# Patient Record
Sex: FEMALE | Race: WHITE | URBAN - METROPOLITAN AREA
[De-identification: names, ages, dates, MRNs, and addresses within clinical notes are randomized per-mention and may not be internally consistent; named-entity substitution may affect disease eponyms.]

---

## 2017-01-26 ENCOUNTER — OFFICE VISIT (OUTPATIENT)
Dept: FAMILY MEDICINE | Facility: CLINIC | Age: 57
End: 2017-01-26

## 2017-01-26 VITALS
SYSTOLIC BLOOD PRESSURE: 126 MMHG | TEMPERATURE: 97.9 F | DIASTOLIC BLOOD PRESSURE: 81 MMHG | WEIGHT: 107.08 LBS | HEART RATE: 80 BPM | BODY MASS INDEX: 18.37 KG/M2

## 2017-01-26 DIAGNOSIS — B02.22 TRIGEMINAL HERPES ZOSTER: Primary | ICD-10-CM

## 2017-01-26 RX ORDER — VALACYCLOVIR HYDROCHLORIDE 1 G/1
1000 TABLET, FILM COATED ORAL 3 TIMES DAILY
Qty: 21 TABLET | Refills: 0 | Status: SHIPPED | OUTPATIENT
Start: 2017-01-26 | End: 2017-10-05

## 2017-01-26 RX ORDER — HYDROCODONE BITARTRATE AND ACETAMINOPHEN 5; 325 MG/1; MG/1
TABLET ORAL
Qty: 10 TABLET | Refills: 0 | Status: SHIPPED | OUTPATIENT
Start: 2017-01-26 | End: 2017-10-05

## 2017-01-26 ASSESSMENT — PAIN SCALES - GENERAL: PAINLEVEL: NO PAIN (0)

## 2017-01-26 NOTE — MR AVS SNAPSHOT
After Visit Summary   1/26/2017    Evelia Teixeira    MRN: [id number]           Patient Information     Date Of Birth          1960        Visit Information        Provider Department      1/26/2017 10:40 AM Timothy Sagastume MD [facility]         Follow-ups after your visit        Who to contact     Please call your clinic at [phone] to:    Ask questions about your health    Make or cancel appointments    Discuss your medicines    Learn about your test results    Speak to your doctor   If you have compliments or concerns about an experience at your clinic, or if you wish to file a complaint, please contact [facility] Patient Relations at [phone] or email us at [email or web address]         Additional Information About Your Visit        MyChart Information     [employer] gives you secure access to your electronic health record. If you see a primary care provider, you can also send messages to your care team and make appointments. If you have questions, please call your primary care clinic.  If you do not have a primary care provider, please call [phone] and they will assist you.      [employer] is an electronic gateway that provides easy, online access to your medical records. With [employer], you can request a clinic appointment, read your test results, renew a prescription or communicate with your care team.     To access your existing account, please contact your [facility] or call [phone] for assistance.        Care EveryWhere ID     This is your Care EveryWhere ID. This could be used by other organizations to access your [place] medical records  [id number]        Your Vitals Were     Pulse Temperature                80 97.9  F (36.6  C) (Oral)           Blood Pressure from Last 3 Encounters:   01/26/17 126/81   08/18/16 111/73   05/27/16 112/70    Weight from Last 3 Encounters:   01/26/17 107 lb 1.3 oz  (48.571 kg)   08/18/16 109 lb 0.6 oz (49.46 kg)   05/27/16 109 lb 8 oz (49.669 kg)              Today, you had the following     No orders found for display       Primary Care Provider Office Phone # Fax #    Timothy Sagastume [phone]       [address] [address]        Thank you!     Thank you for choosing [facility]  for your care. Our goal is always to provide you with excellent care. Hearing back from our patients is one way we can continue to improve our services. Please take a few minutes to complete the written survey that you may receive in the mail after your visit with us. Thank you!             Your Updated Medication List - Protect others around you: Learn how to safely use, store and throw away your medicines at www.disposemymeds.org.          This list is accurate as of: 1/26/17 10:59 AM.  Always use your most recent med list.                   Brand Name Dispense Instructions for use    CALCIUM PO          EXCEDRIN PO      Take 1 tablet by mouth as needed.       MAGNESIUM PO      With Vitamin B.       VITAMIN D-3 PO      Take 500 Units by mouth

## 2017-01-26 NOTE — NURSING NOTE
56 year old  Chief Complaint   Patient presents with     Derm Problem     Sores on nose and mouth. Started friday or saturday.       Blood pressure 126/81, pulse 80, temperature 97.9  F (36.6  C), temperature source Oral, weight 107 lb 1.3 oz (48.571 kg). Body mass index is 18.37 kg/(m^2).  Patient Active Problem List   Diagnosis     Preventative health care     Migraine headache     Bilateral bunions     Family history of early CAD       Wt Readings from Last 2 Encounters:   01/26/17 107 lb 1.3 oz (48.571 kg)   08/18/16 109 lb 0.6 oz (49.46 kg)     BP Readings from Last 3 Encounters:   01/26/17 126/81   08/18/16 111/73   05/27/16 112/70         Current Outpatient Prescriptions   Medication     Cholecalciferol (VITAMIN D-3 PO)     MAGNESIUM PO     CALCIUM PO     Aspirin-Acetaminophen-Caffeine (EXCEDRIN PO)     No current facility-administered medications for this visit.       Social History   Substance Use Topics     Smoking status: Never Smoker      Smokeless tobacco: Not on file     Alcohol Use: No       Health Maintenance Due   Topic Date Due     ADVANCE DIRECTIVE PLANNING Q5 YRS (NO INBASKET)  12/15/1976     HEPATITIS C SCREENING  12/15/1976     COLON CANCER SCREEN (SYSTEM ASSIGNED)  10/15/2013     DEXA Q3 YR  09/23/2014     INFLUENZA VACCINE (SYSTEM ASSIGNED)  09/01/2016       PAP      NIL   9/20/2010 January 26, 2017 10:41 AM    Harriett Degroot CMA

## 2017-01-27 NOTE — PROGRESS NOTES
CHIEF COMPLAINT:  Sores in nose and mouth.      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  Symptoms began 6-7 days ago.  At first, she noticed this very subtle increase in sensitivity.  However, soon after that she started noticing some red bumps and even blisters.  All of this occurred after she had worked almost 60 straight hours.  She is not having any problems at work, but she has just had a lot of stress and lack of sleep.  She had chickenpox as a child.  Her  thinks that she might have shingles.      ACTIVE MEDICAL PROBLEMS:  Briefly reviewed.       CURRENT MEDICATIONS:  She is on no prescription medications.        SOCIAL HISTORY:  She is not a smoker.      OBJECTIVE:  Evelia is in no distress.  Vital signs are stable.  She has a cluster of red bumps on the right nostril on and also on the right lip, nothing near the eye.  This would seem to be a fairly classic case of trigeminal shingles occurring in the V2 distribution.  We had a long conversation about this.      ASSESSMENT AND PLAN:   1.  Trigeminal herpes zoster on the right.   2.  Unfortunately, we are out of the perfect time frame to treat this.  However, it is possible that she has had another lesion or 2 emerge over the last day or so.  Therefore, we are going to go ahead with Valacyclovir 1000 mg pills, 1 tablet 3 times daily for 7 days, 21 tablets dispensed with no refills.   3.  She had a very hard time sleeping last night due to the pain.  I will go ahead and prescribe hydrocodone 5/325 mg tablets, quantity 10, 1-2 every 4-6 hours as needed.   4.  She can use some over-the-counter Lidoderm/lidocaine cream or gel.   5.  She will let me know how she is doing in the coming days.   6.  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  She will notify Employee Health at her  [facility] and let them know that she has been diagnosed with shingles.  She was wearing a mask when she was in contact with young children yesterday.  Call with any problems or questions.

## 2017-02-08 ENCOUNTER — TELEPHONE (OUTPATIENT)
Dept: NURSING | Facility: CLINIC | Age: 57
End: 2017-02-08

## 2017-02-08 DIAGNOSIS — J10.1 INFLUENZA A: Primary | ICD-10-CM

## 2017-02-08 RX ORDER — OSELTAMIVIR PHOSPHATE 75 MG/1
75 CAPSULE ORAL 2 TIMES DAILY
Qty: 10 CAPSULE | Refills: 0 | Status: SHIPPED | OUTPATIENT
Start: 2017-02-08 | End: 2017-10-05

## 2017-02-08 NOTE — TELEPHONE ENCOUNTER
Discussed pt symptoms with Dr Tanika CUTLER for tamiflu course. Script to pharmacy.  Pt reports temp 101.6 this morning. She will call back prn concerns.

## 2017-02-08 NOTE — TELEPHONE ENCOUNTER
"Call Type: Triage Call    Presenting Problem: flu/ cough and \"blowing nose\" no fever felt  chilled before bed time/and a headache kept her awake during the  day/others have had the flu at work/ and she just got over shingles/  advised to call back and take the afternnoon appointment at the  clinic/ wants FNA to leaft a message for Dr Jimenez to call/ wants  to kow if he will order tamiflu/ advised that it would be best to  know if she has the flu first/ but she requested a call from the  clinic first  Triage Note:  Guideline Title: Flu-Like Symptoms  Recommended Disposition: Call Provider within 24 Hours  Original Inclination: Did not know what to do  Override Disposition:  Intended Action: Follow advice given  Physician Contacted: No  In high risk group for complications of influenza AND has questions/concerns ?  YES  Signs of dehydration ? NO  Severe breathing problems ? NO  Breathing problems ? NO  Sudden change in mental status ? NO  Choking sensation, cannot swallow own saliva with associated drooling and soft  muffled voice ? NO  New onset of stiff neck causing pain with forward head movement, severe  generalized headache, fever, or altered mental status ? NO  Any temperature elevation in an individual with a weakened immune system OR a  frail elderly person ? NO  Flu-like symptoms associated with a cough and breathing that is becoming more  difficult ? NO  Evaluated by provider AND symptoms worsening when following recommended treatment  plan for 48 hours ? NO  New OR worsening flu-like illness AND in high risk group for complications ? NO  Gradual onset of upper respiratory illness signs and symptoms(If there is any  doubt that symptoms may be an upper respiratory illness, use this guideline,  Flu-Like Symptoms ) ? NO  Severe headache not relieved with 8 hours of home care ? NO  Physician Instructions:  Care Advice: To help prevent a widespread community outbreak of the flu,  call the call center, your " clinic or provider's office to discuss any  questions or concerns before going in unless you have severe respiratory or  any nervous system symptoms.  Speak with a  provider within 8 hours if develop flu-like symptoms and are  at risk of complications, including individuals over the age of 50, women  who are 3 months or more pregnant, living in a long-term care facility, or  have a chronic disease (diabetes, lung, heart, or kidney) or a weakened  immune system.  Influenza - Expected Course: - Symptoms start to improve in 3 to 7 days. -  Cough and feeling tired (malaise) may continue for several weeks. - Cough  and extreme fatigue lasting more than 3 weeks needs medical evaluation. -  Remain at home at least 24 hours after being free of fever 100F (37.8C)  without the use of fever reducing medication.  Influenza Prevention - Good Health Habits: - Practice good health habits:  Get 7-8 hours of sleep each night.  Eat healthy foods and drink sugar-free  fluids.  Exercise most days of a week and manage your stress.  - Practice  prevention by covering your mouth and nose with a tissue when sneezing or  coughing and throwing the tissue into the trash after one use.  Wash your  hands often or use an alcohol-based gel or wipe.  Avoid touching your eyes,  nose and mouth.  - Be sure to keep your immunizations up to date. - Avoid  crowds during peak flu season.  - Stay at home when not feeling well and  avoid close contact with people who are sick.  Influenza Respiratory Hygiene: - Cover the nose/mouth tightly with a tissue  when coughing or sneezing. - Use tissue one time and discard in the nearest  waste receptacle. - Wash hands with soap and water or alcohol-based hand  rub for at least 20 seconds after coming into contact with respiratory  secretions and contaminated objects/materials. - When a tissue is not  available, cough into the bend of the elbow. - Avoid touching your eyes,  nose or mouth. - When ill wear a  disposable face mask when around others,  especially around those at high risk for flu-related complications, to help  decrease the likelihood of infecting them.  Antiviral medications - Prescribed medications are available in pills,  liquids or inhaled powder that help prevent or lessen symptoms of viral  illnesses. Antivirals are usually given to persons at a higher risk for  flu-related complications or who are very ill. Most healthy people do not  need to be treated with antiviral drugs. - Recommended medications for use  against the most recently circulating influenza viruses:  oseltamivir  (Tamiflu) and zanamivir (Relenza). - Work best when started within the  first two days of symptoms and continue for at least 5 days after exposure.  - Speak with your provider as soon as possible if exposed to the flu or if  you have new symptoms of the flu.  Influenza - Transmission: - Flu virus is spread through the air in droplets  when a flu-infected person coughs, sneezes or talks and another person  breathes in the droplets, or by touching a surface like a door knob,  telephone, or keyboard that has been contaminated by the droplets and then  touching the mouth, nose, or eyes. - An individual may be passing on the  flu before they have any symptoms. - An individual may continue to be  contagious with the flu for up to 7 days after the symptoms start.  Influenza Prevention - Immunization: * Vaccination each season is the best  protection against getting the flu and its complications. The most common  complication of influenza is pneumonia. * CDC recommends annual flu vaccine  for everyone 6 months and older. Flu viruses change quickly so last year's  vaccine may not protect you from this year's virus. * Flu vaccine is  available in two forms, a shot or a nasal spray. - The shot contains killed  viruses so you can't pass the flu to anyone else. It is approved for  everyone age 6 months and older. - The nasal spray  contains weakened live  flu viruses that won't give you the flu but in rare cases can be passed to  others. It is approved for healthy persons ages 2 to 49 years. It should  NOT be given to pregnant women, those with a chronic medical condition, a  weakened immune system, or to children and adolescents receiving aspirin  therapy. * DO NOT get a flu shot if you: - Have had a severe allergic  reaction to the vaccine in the past - Have an allergy to eggs or any  ingredients in the vaccine - Have ever had Guillain-[place]' Syndrome - Have  a fever that day or are not feeling well * Full effect of the vaccination  takes two weeks. If you are exposed to the flu virus during the two weeks  you may catch the flu. If this year's vaccine does not match the flu  viruses you are exposed to during this flu season, the flu shot won't  protect you.  Influenza Prevention - Personal Hygiene: - Wash your hands with soap and  water often, for at least 20 seconds, especially after you cough or sneeze  or when you have touched a contaminated surface/object (door knob,  telephone, etc.) - If soap and water are not available, use an  alcohol-based hand  containing at least 60% alcohol, rub hands  together until hands are dry. - Avoid putting your hands and fingers to the  face, nose, mouth or eyes. - During the flu season avoid crowded places  (shopping areas, planes, sporting events).

## 2017-06-18 ENCOUNTER — NURSE TRIAGE (OUTPATIENT)
Dept: NURSING | Facility: CLINIC | Age: 57
End: 2017-06-18

## 2017-06-18 NOTE — TELEPHONE ENCOUNTER
"Patient states she had Shingles this past January and \"Waited too long\" to be seen by a physician. States she was advised by Dr. Jimenez to call right away if she developed symptoms again and Acyclovir would be prescribed. Declined triage.   Onset 06/15/17 of tenderness and itching in back of tongue on the right side. Now also has tenderness along lower (R) gum and inside corner of mouth and \"Sharp\" pain in area when eating or swallowing. Swollen Lymph node below (R) jaw.   Patient states symptoms are the same as when she had Shingles in January.  Dr. Sadler paged to [facility] at [phone] @ 0754 hrs via [employer] page . Second page sent at 0813 hrs via [email or web address].   Dr. Sadler contacted at 0825 hrs via page . Dr. Sadler advised patient would have to be evaluated.  Symptoms are not consistent with Shingles.Patient given Urgent Care option. Patient verbalized understanding and will contact clinic tomorrow morning for a same day appointment.      Reason for Disposition    [1] Request for URGENT new prescription or refill of \"essential\" medication (i.e., likelihood of harm to patient if not taken) AND [2] triager unable to fill per unit policy    Additional Information    Negative: Drug overdose and nurse unable to answer question    Negative: Caller requesting information not related to medicine    Negative: Caller requesting a prescription for Strep throat and has a positive culture result    Negative: Rash while taking a medication or within 3 days of stopping it    Negative: Immunization reaction suspected    Negative: [1] Asthma and [2] having symptoms of asthma (cough, wheezing, etc)    Negative: MORE THAN A DOUBLE DOSE of a prescription or over-the-counter (OTC) drug    Negative: [1] DOUBLE DOSE (an extra dose or lesser amount) of over-the-counter (OTC) drug AND [2] any symptoms (e.g., dizziness, nausea, pain, sleepiness)    Negative: [1] DOUBLE DOSE (an extra dose or lesser amount) of prescription " drug AND [2] any symptoms (e.g., dizziness, nausea, pain, sleepiness)    Negative: Took another person's prescription drug    Negative: [1] DOUBLE DOSE (an extra dose or lesser amount) of prescription drug AND [2] NO symptoms (Exception: a double dose of antibiotics)    Negative: Diabetes drug error or overdose (e.g., insulin or extra dose)    Protocols used: MEDICATION QUESTION CALL-ADULT-

## 2017-06-18 NOTE — TELEPHONE ENCOUNTER
Symptoms today are pain in areas on right hand side lower mouth.  Evelia is requesting to speak with MD Sagastume.  Evelia feels that she has shingles again and was  Instructed by MD Sagastume to phone right away if symptoms develop.

## 2017-10-05 ENCOUNTER — OFFICE VISIT (OUTPATIENT)
Dept: FAMILY MEDICINE | Facility: CLINIC | Age: 57
End: 2017-10-05

## 2017-10-05 VITALS
TEMPERATURE: 98.1 F | BODY MASS INDEX: 18.45 KG/M2 | SYSTOLIC BLOOD PRESSURE: 109 MMHG | HEIGHT: 64 IN | DIASTOLIC BLOOD PRESSURE: 75 MMHG | WEIGHT: 108.08 LBS | HEART RATE: 74 BPM | OXYGEN SATURATION: 99 %

## 2017-10-05 DIAGNOSIS — Z23 NEED FOR SHINGLES VACCINE: ICD-10-CM

## 2017-10-05 DIAGNOSIS — Z13.1 SCREENING FOR DIABETES MELLITUS: ICD-10-CM

## 2017-10-05 DIAGNOSIS — Z00.00 PREVENTATIVE HEALTH CARE: Primary | ICD-10-CM

## 2017-10-05 DIAGNOSIS — Z82.49 FAMILY HISTORY OF EARLY CAD: ICD-10-CM

## 2017-10-05 DIAGNOSIS — Z13.220 SCREENING CHOLESTEROL LEVEL: ICD-10-CM

## 2017-10-05 DIAGNOSIS — Z86.19 HISTORY OF SHINGLES: ICD-10-CM

## 2017-10-05 DIAGNOSIS — Z11.59 NEED FOR HEPATITIS C SCREENING TEST: ICD-10-CM

## 2017-10-05 DIAGNOSIS — Z12.11 SCREEN FOR COLON CANCER: ICD-10-CM

## 2017-10-05 DIAGNOSIS — Z13.820 SCREENING FOR OSTEOPOROSIS: ICD-10-CM

## 2017-10-05 LAB
CHOLEST SERPL-MCNC: 184 MG/DL
GLUCOSE SERPL-MCNC: 80 MG/DL (ref 70–99)
HDLC SERPL-MCNC: 76 MG/DL
LDLC SERPL CALC-MCNC: 98 MG/DL
NONHDLC SERPL-MCNC: 109 MG/DL
TRIGL SERPL-MCNC: 52 MG/DL

## 2017-10-05 ASSESSMENT — PAIN SCALES - GENERAL: PAINLEVEL: MILD PAIN (2)

## 2017-10-05 NOTE — NURSING NOTE
"56 year old  Chief Complaint   Patient presents with     Physical       Blood pressure 109/75, pulse 74, temperature 98.1  F (36.7  C), temperature source Oral, height 5' 4.2\" (163.1 cm), weight 108 lb 1.3 oz (49 kg), SpO2 99 %. Body mass index is 18.44 kg/(m^2).  Patient Active Problem List   Diagnosis     Preventative health care     Migraine headache     Bilateral bunions     Family history of early CAD       Wt Readings from Last 2 Encounters:   10/05/17 108 lb 1.3 oz (49 kg)   01/26/17 107 lb 1.3 oz (48.6 kg)     BP Readings from Last 3 Encounters:   10/05/17 109/75   01/26/17 126/81   08/18/16 111/73         Current Outpatient Prescriptions   Medication     MAGNESIUM PO     CALCIUM PO     Aspirin-Acetaminophen-Caffeine (EXCEDRIN PO)     No current facility-administered medications for this visit.        Social History   Substance Use Topics     Smoking status: Never Smoker     Smokeless tobacco: Never Used     Alcohol use No       Health Maintenance Due   Topic Date Due     HEPATITIS C SCREENING  12/15/1976     ADVANCE DIRECTIVE PLANNING Q5 YRS  12/15/2013     DEXA Q3 YR  09/23/2014     INFLUENZA VACCINE (SYSTEM ASSIGNED)  09/01/2017       Lab Results   Component Value Date    PAP NIL 09/20/2010         Taylor Fountain CMA  October 5, 2017 8:12 AM  "

## 2017-10-05 NOTE — MR AVS SNAPSHOT
After Visit Summary   10/5/2017    Evelia Teixeira    MRN: [id number]           Patient Information     Date Of Birth          1960        Visit Information        Provider Department      10/5/2017 8:00 AM Timothy Sagastume MD [facility]        Today's Diagnoses     Preventative health care    -  1    Need for hepatitis C screening test        Screening for osteoporosis        Screening for diabetes mellitus        Screening cholesterol level        Family history of early CAD        Screen for colon cancer        History of shingles        Need for shingles vaccine           Follow-ups after your visit        Follow-up notes from your care team     Return in about 1 year (around 10/5/2018) for Physical Exam.      Future tests that were ordered for you today     Open Future Orders        Priority Expected Expires Ordered    ZOSTER VACC LIVE SUBQ NJX Routine  10/5/2018 10/5/2017    INJECTION INTRAMUSCULAR OR SUB-Q Routine  10/4/2018 10/5/2017    Fecal colorectal cancer screen FIT Routine 10/26/2017 12/28/2017 10/5/2017            Who to contact     Please call your clinic at [phone] to:    Ask questions about your health    Make or cancel appointments    Discuss your medicines    Learn about your test results    Speak to your doctor   If you have compliments or concerns about an experience at your clinic, or if you wish to file a complaint, please contact [facility] Patient Relations at [phone] or email us at [email or web address]         Additional Information About Your Visit        MyChart Information     MyChart gives you secure access to your electronic health record. If you see a primary care provider, you can also send messages to your care team and make appointments. If you have questions, please call your primary care clinic.  If you do not have a primary care provider, please call [phone] and they will assist you.      MyChart is  "an electronic gateway that provides easy, online access to your medical records. With [employer], you can request a clinic appointment, read your test results, renew a prescription or communicate with your care team.     To access your existing account, please contact your [facility] or call [phone] for assistance.        Care EveryWhere ID     This is your Care EveryWhere ID. This could be used by other organizations to access your [place] medical records  [id number]        Your Vitals Were     Pulse Temperature Height Pulse Oximetry BMI (Body Mass Index)       74 98.1  F (36.7  C) (Oral) 5' 4.2\" (163.1 cm) 99% 18.44 kg/m2        Blood Pressure from Last 3 Encounters:   10/05/17 109/75   01/26/17 126/81   08/18/16 111/73    Weight from Last 3 Encounters:   10/05/17 108 lb 1.3 oz (49 kg)   01/26/17 107 lb 1.3 oz (48.6 kg)   08/18/16 109 lb 0.6 oz (49.5 kg)              We Performed the Following     CRP cardiac risk     Glucose     Hepatitis C Screen Reflex to HCV RNA Quant and Genotype     Lipid Profile        Primary Care Provider Office Phone # Fax #    Timothy Sagastume [phone]       [address] [place] [address]        Equal Access to Services     JOHNNA GÓMEZ : Hadii wero ku hadasho Soomaali, waaxda luqadaha, qaybta kaalmada adeegyada, hebert ely haypierre baird . So [facility] [phone].    ATENCIÓN: Si habla español, tiene a lomeli disposición servicios gratuitos de asistencia lingüística. Llame al [phone].    We comply with applicable federal civil rights laws and [place] laws. We do not discriminate on the basis of race, color, national origin, age, disability, sex, sexual orientation, or gender identity.            Thank you!     Thank you for choosing [facility]  for your care. Our goal is always to provide you with excellent care. Hearing back from our patients is one way we can continue to improve our services. " Please take a few minutes to complete the written survey that you may receive in the mail after your visit with us. Thank you!             Your Updated Medication List - Protect others around you: Learn how to safely use, store and throw away your medicines at www.disposemymeds.org.          This list is accurate as of: 10/5/17  3:48 PM.  Always use your most recent med list.                   Brand Name Dispense Instructions for use Diagnosis    CALCIUM PO           EXCEDRIN PO      Take 1 tablet by mouth as needed.        MAGNESIUM PO      With Vitamin B.

## 2017-10-06 LAB
CRP SERPL HS-MCNC: <0.2 MG/L
HCV AB SERPL QL IA: NONREACTIVE

## 2017-10-07 NOTE — PROGRESS NOTES
"CHIEF COMPLAINT:  Physical exam.      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  Overall, she is doing quite well.      She has had some chronic headaches and feels that they are tied to her C1 being out of adjustment.  She happened to find one of two chiropractors in the world who works on this and he just happens to be here in [place], [place].  She also feels that her \"C2\" goes out as well.  Excedrin can help, but she prefers not to take medications.  In addition, she feels that a lot of the problems she has in her head originate with her feet.  Her foot pain actually got worse after having bunion surgery.  She is wearing some pads in her shoes and they help.  Despite all that, she actually feels the best that she has felt in quite some time.      ACTIVE MEDICAL PROBLEMS:  Reviewed.      CURRENT MEDICATIONS:  Consist only of supplements and over-the-counter medications.      SOCIAL, FAMILY AND PAST SURGICAL HISTORIES:  Unchanged at this point.      HEALTHCARE MAINTENANCE:  Eyecare is up-to-date.  Her hearing is fine.  Dental care is up-to-date.  She did have braces and they are now off.  Blood pressure is 109/75.  BMI 18.44, and she would actually like to weigh a little bit more than she currently weighs.  She would like to get a shingles vaccine but she will need to check with her insurance plan first.  She is up-to-date with a tetanus booster.  Colon cancer screening discussed.  She would like to go ahead with a FIT card.  She is up-to-date with both gynecologic care and mammography.  We will screen for hepatitis C today.      REVIEW OF SYSTEMS:  A 10-point review of systems is negative.      OBJECTIVE:  Evelia is in absolutely no distress.  Affect upbeat.  BP is 109/75 with a heart rate of 74 and regular.  Temperature is 98.1.  She is 5 feet 4.2 and weighs 108 with a BMI of 18.44.  O2 sats are 99% on room air.   HEENT:  Head is " normocephalic, atraumatic.  Ears show a significant amount of dry wax, more on the left than the right.  I can still see around it to the tympanic membranes.  Therefore, it is not affecting her hearing.  Nose is free of any congestion or discharge.  Teeth in good repair.  Mucous membranes are moist.  Throat looks benign.   NECK:  Supple without adenopathy or thyromegaly.  No carotid bruits are heard, no JVD.   BACK:  Smooth and straight.  No pain to percussion.  No CVA tenderness.   LUNGS:  Clear to auscultation bilaterally.   HEART:  Regular rate and rhythm without murmur.   ABDOMEN:  Benign.   EXTREMITIES:  Ankles are free of any edema.   SKIN:  Warm and dry.  Good peripheral pulses.      ASSESSMENT/PLAN:   1.  Adult physical exam, up-to-date with health care maintenance strategies.   2.  Family history of early heart disease.  CRP cardiac risk marker pending.   3.  Colon cancer screening with a FIT card.   4.  History of shingles.  As long as her insurance is fine with getting the immunization, we can go ahead and do that.   5.  Screening for diabetes and hyperlipidemia with a glucose and lipid panel, respectively.   6.  Hepatitis C antibody screening.   7.  I look forward to seeing her back in approximately 1 year.

## 2017-12-12 DIAGNOSIS — Z12.11 SCREEN FOR COLON CANCER: ICD-10-CM

## 2017-12-12 PROCEDURE — 82274 ASSAY TEST FOR BLOOD FECAL: CPT | Performed by: FAMILY MEDICINE

## 2017-12-13 LAB — HEMOCCULT STL QL IA: NEGATIVE

## 2018-03-03 ENCOUNTER — HEALTH MAINTENANCE LETTER (OUTPATIENT)
Age: 58
End: 2018-03-03

## 2018-05-16 ENCOUNTER — TELEPHONE (OUTPATIENT)
Dept: FAMILY MEDICINE | Facility: CLINIC | Age: 58
End: 2018-05-16

## 2018-05-16 NOTE — TELEPHONE ENCOUNTER
----- Message from Rosalva Moore sent at 5/14/2018  2:37 PM CDT -----  Regarding: Requesting Orthalogy referral  Contact: [phone]  Patient is Requesting for a referral for orthology for the clinic in [place]. Patient can be reached at  [phone].    Thank you,    [facility]  Please DO NOT send this message and/or reply back to sender.  Call Center Representatives DO NOT respond to messages.

## 2018-05-17 DIAGNOSIS — M79.672 BILATERAL FOOT PAIN: Primary | ICD-10-CM

## 2018-05-17 DIAGNOSIS — M79.671 BILATERAL FOOT PAIN: Primary | ICD-10-CM

## 2018-05-18 NOTE — TELEPHONE ENCOUNTER
Sure.  DEONTE?     Timothy        As pt prefers Orthology in [place] for PT, he did authorize this. Orders prepared and faxed to Orthology [phone]. MARJORIE for pt that orders were sent - she is to call there for appt. She is to call back prn concerns.

## 2018-08-06 ENCOUNTER — TELEPHONE (OUTPATIENT)
Dept: FAMILY MEDICINE | Facility: CLINIC | Age: 58
End: 2018-08-06

## 2018-08-06 DIAGNOSIS — G43.909 MIGRAINE: Primary | ICD-10-CM

## 2018-08-06 NOTE — TELEPHONE ENCOUNTER
I completed the referral and faxed this over to the Orthology clinic in [place].  Renea ETIENNE  [facility]  August 6, 2018 11:26 AM      ----- Message from Kianna Perez RN sent at 8/3/2018  3:24 PM CDT -----  Regarding: FW: PT is requesting a referral for migraines to Orthology in [place]  Contact: [phone]  Right before he left, Timothy said he is fine with this referral  Need to do and also let pt know    Kianna   ----- Message -----     From: Salomón Long     Sent: 8/3/2018  10:51 AM       To: [facility] Nurse Pool  Subject: PT is requesting a referral for migraines to#    PT is requesting a referral for migraines to Orthology in [place] and would like a call back at [phone] once the order is placed.  Please follow up.    Thank you,  Salomón    Call Center

## 2018-10-31 NOTE — PROGRESS NOTES
"CHIEF COMPLAINT: Annual Physical      HISTORY OF PRESENT ILLNESS: Evelia Teixeira is a 57 year old female who presents with her  for an annual physical. Overall, she is doing well.    She would like to test her vitamin D and B12 level.    She has a history of migraine headaches. She reports that they are usually triggered by stress. She is wondering if there is any preventative measures she can take.      FAMILY, SOCIAL AND PAST SURGICAL HISTORIES:  Unchanged.       MEDICATIONS:  Carefully reviewed.       HEALTHCARE MAINTENANCE:    Health Maintenance   Topic Date Due     HIV SCREEN (SYSTEM ASSIGNED)  12/15/1976     ADVANCE DIRECTIVE PLANNING Q5 YRS  12/15/2013     PAP SCREENING Q3 YR (SYSTEM ASSIGNED)  01/01/2018     MAMMO SCREEN Q2 YR (SYSTEM ASSIGNED)  11/10/2018     FIT Q1 YR  12/12/2018     PHQ-2 Q1 YR  11/08/2019     DEXA Q3 YR  11/08/2021     LIPID SCREEN Q5 YR FEMALE (SYSTEM ASSIGNED)  10/05/2022     TETANUS IMMUNIZATION (SYSTEM ASSIGNED)  08/18/2026     INFLUENZA VACCINE  Addressed     HEPATITIS C SCREENING  Completed     Eye exam: Eye care up to date.  Hearing: Reports  has some concern. Passes finger rub test, hearing less acute on left.  Dental: Dental care up to date.  Immunizations: Due for flu vaccine, declined.  Colonoscopy: Due for colon cancer screening. FIT card given.  Pap: Up to date.  Mammogram: Scheduled for next month.    REVIEW OF SYSTEMS:  A 10-point review is negative.       OBJECTIVE:    GENERAL: Evelia Teixeira is in no distress.  Affect is upbeat.     VITAL SIGNS: /79 (BP Location: Left arm, Patient Position: Chair, Cuff Size: Adult Regular)  Pulse 72  Temp 97.7  F (36.5  C) (Oral)  Ht 5' 4.09\" (162.8 cm)  Wt 109 lb (49.4 kg)  SpO2 100%  BMI 18.65 kg/m2  HEENT:  Head is normocephalic, atraumatic. Ears clear bilaterally. No pain with palpation of the frontal or maxillary sinuses.  Eyes are grossly normal.  Nose is free of any congestion or discharge.  Teeth in good repair. "  Mucous membranes are moist.  Throat looks benign.  Neck is supple without adenopathy or thyromegaly.  No carotid bruits are heard, no JVD.   BACK:  Smooth and straight.  No pain to percussion.  No CVA tenderness.   LUNGS:  Clear to auscultation bilaterally.   HEART:  Regular rate and rhythm without murmur.   ABDOMEN:  Benign.     EXTREMITIES:  Ankles free of any edema.   SKIN:  Warm and dry.       LABORATORY DATA: Labs pending      ASSESSMENT AND PLAN:   1. Adult physical exam, up to date on healthcare maintenance strategies  2. HCM: Flu vaccine declined. FIT card given.  3. Hearing loss: Referral placed to audiology.  4. Vitamin deficiency screening: Vitamin D and B12 level, pending.  5. Labs: Lipid panel and glucose, pending.    Call with any problems or questions.       I, Bernarda Sanchez, am serving as a scribe to document services personally performed by Dr. Timothy Sagastume, based on data collection and the provider's statements to me.

## 2018-11-08 ENCOUNTER — OFFICE VISIT (OUTPATIENT)
Dept: FAMILY MEDICINE | Facility: CLINIC | Age: 58
End: 2018-11-08
Payer: COMMERCIAL

## 2018-11-08 VITALS
BODY MASS INDEX: 18.61 KG/M2 | HEART RATE: 72 BPM | OXYGEN SATURATION: 100 % | WEIGHT: 109 LBS | SYSTOLIC BLOOD PRESSURE: 125 MMHG | TEMPERATURE: 97.7 F | DIASTOLIC BLOOD PRESSURE: 79 MMHG | HEIGHT: 64 IN

## 2018-11-08 DIAGNOSIS — Z13.1 SCREENING FOR DIABETES MELLITUS: ICD-10-CM

## 2018-11-08 DIAGNOSIS — Z13.220 SCREENING CHOLESTEROL LEVEL: ICD-10-CM

## 2018-11-08 DIAGNOSIS — Z12.11 SCREEN FOR COLON CANCER: ICD-10-CM

## 2018-11-08 DIAGNOSIS — Z13.21 ENCOUNTER FOR VITAMIN DEFICIENCY SCREENING: ICD-10-CM

## 2018-11-08 DIAGNOSIS — Z00.00 PREVENTATIVE HEALTH CARE: Primary | ICD-10-CM

## 2018-11-08 DIAGNOSIS — H90.3 SENSORY HEARING LOSS, BILATERAL: ICD-10-CM

## 2018-11-08 LAB
CHOLEST SERPL-MCNC: 181 MG/DL (ref 0–200)
CHOLEST/HDLC SERPL: 2.4 {RATIO} (ref 0–5)
FASTING SPECIMEN: YES
GLUCOSE P FAST SERPL-MCNC: 83 MG/DL (ref 51–109)
HDLC SERPL-MCNC: 76 MG/DL
LDLC SERPL CALC-MCNC: 91 MG/DL (ref 0–129)
TRIGL SERPL-MCNC: 70 MG/DL (ref 0–150)
VIT B12 SERPL-MCNC: 545 PG/ML (ref 193–986)
VLDL-CHOLESTEROL: 14 (ref 7–32)

## 2018-11-08 ASSESSMENT — PAIN SCALES - GENERAL: PAINLEVEL: NO PAIN (0)

## 2018-11-08 NOTE — NURSING NOTE
"57 year old  Chief Complaint   Patient presents with     Physical     Pt is fasting.       Blood pressure 125/79, pulse 72, temperature 97.7  F (36.5  C), temperature source Oral, height 5' 4.09\" (162.8 cm), weight 109 lb (49.4 kg), SpO2 100 %. Body mass index is 18.65 kg/(m^2).  Patient Active Problem List   Diagnosis     Preventative health care     Migraine headache     Bilateral bunions     Family history of early CAD       Wt Readings from Last 2 Encounters:   11/08/18 109 lb (49.4 kg)   10/05/17 108 lb 1.3 oz (49 kg)     BP Readings from Last 3 Encounters:   11/08/18 125/79   10/05/17 109/75   01/26/17 126/81         Current Outpatient Prescriptions   Medication     Aspirin-Acetaminophen-Caffeine (EXCEDRIN PO)     CALCIUM PO     MAGNESIUM PO     No current facility-administered medications for this visit.        Social History   Substance Use Topics     Smoking status: Never Smoker     Smokeless tobacco: Never Used     Alcohol use No       Health Maintenance Due   Topic Date Due     HIV SCREEN (SYSTEM ASSIGNED)  12/15/1976     ADVANCE DIRECTIVE PLANNING Q5 YRS  12/15/2013     DEXA Q3 YR  09/23/2014     PAP SCREENING Q3 YR (SYSTEM ASSIGNED)  01/01/2018     INFLUENZA VACCINE (1) 09/01/2018     PHQ-2 Q1 YR  10/05/2018     MAMMO SCREEN Q2 YR (SYSTEM ASSIGNED)  11/10/2018       Lab Results   Component Value Date    PAP NIL 09/20/2010       Radha Ball MA  November 8, 2018 8:12 AM    "

## 2018-11-08 NOTE — MR AVS SNAPSHOT
After Visit Summary   11/8/2018    Evelia Teixeira    MRN: [id number]           Patient Information     Date Of Birth          1960        Visit Information        Provider Department      11/8/2018 8:00 AM Timothy Sagastume MD [facility]        Today's Diagnoses     Preventative health care    -  1    Screen for colon cancer        Screening cholesterol level        Screening for diabetes mellitus        Sensory hearing loss, bilateral        Encounter for vitamin deficiency screening           Follow-ups after your visit        Additional Services     AUDIOLOGY ADULT REFERRAL       Your provider has referred you to: ealth: Audiology and Aural Rehab [facility] [phone]   [email or web address]    Specialty Testing:  Audiogram Only                  Follow-up notes from your care team     Return in about 1 year (around 11/8/2019) for Physical Exam.      Who to contact     Please call your clinic at [phone] to:    Ask questions about your health    Make or cancel appointments    Discuss your medicines    Learn about your test results    Speak to your doctor            Additional Information About Your Visit        [employer] Information     [employer] gives you secure access to your electronic health record. If you see a primary care provider, you can also send messages to your care team and make appointments. If you have questions, please call your primary care clinic.  If you do not have a primary care provider, please call [phone] and they will assist you.      [employer] is an electronic gateway that provides easy, online access to your medical records. With [employer], you can request a clinic appointment, read your test results, renew a prescription or communicate with your care team.     To access your existing account, please contact your [facility] or call [phone] for  "assistance.        Care EveryWhere ID     This is your Care EveryWhere ID. This could be used by other organizations to access your [place] medical records  [id number]        Your Vitals Were     Pulse Temperature Height Pulse Oximetry BMI (Body Mass Index)       72 97.7  F (36.5  C) (Oral) 5' 4.09\" (162.8 cm) 100% 18.65 kg/m2        Blood Pressure from Last 3 Encounters:   11/08/18 125/79   10/05/17 109/75   01/26/17 126/81    Weight from Last 3 Encounters:   11/08/18 109 lb (49.4 kg)   10/05/17 108 lb 1.3 oz (49 kg)   01/26/17 107 lb 1.3 oz (48.6 kg)              We Performed the Following     AUDIOLOGY ADULT REFERRAL     Glucose Fasting ([facility])     Lipid Panel ([facility])     Vitamin B12     Vitamin D Deficiency        Primary Care Provider Office Phone # Fax #    Timothy Sagastume [phone]       [address] [place] [address]        Equal Access to Services     JORY GÓMEZ : Hadii wero ku hadasho Soomaali, waaxda luqadaha, qaybta kaalmada adeegyada, hebert baird . So [facility] [phone].    ATENCIÓN: Si habla español, tiene a lomeli disposición servicios gratuitos de asistencia lingüística. [facility] [phone].    We comply with applicable federal civil rights laws and [place] laws. We do not discriminate on the basis of race, color, national origin, age, disability, sex, sexual orientation, or gender identity.            Thank you!     Thank you for choosing [facility]  for your care. Our goal is always to provide you with excellent care. Hearing back from our patients is one way we can continue to improve our services. Please take a few minutes to complete the written survey that you may receive in the mail after your visit with us. Thank you!             Your Updated Medication List - Protect others around you: Learn how to safely use, store and throw away your medicines at www.disposemymeds.org.          This list is accurate as of " 11/8/18 11:59 PM.  Always use your most recent med list.                   Brand Name Dispense Instructions for use Diagnosis    CALCIUM PO           EXCEDRIN PO      Take 1 tablet by mouth as needed.        MAGNESIUM PO      With Vitamin B.

## 2018-11-09 LAB — DEPRECATED CALCIDIOL+CALCIFEROL SERPL-MC: 34 UG/L (ref 20–75)

## 2018-11-10 PROBLEM — H90.3 SENSORY HEARING LOSS, BILATERAL: Status: ACTIVE | Noted: 2018-11-10

## 2018-12-17 DIAGNOSIS — Z12.11 SCREEN FOR COLON CANCER: ICD-10-CM

## 2018-12-17 PROCEDURE — 82274 ASSAY TEST FOR BLOOD FECAL: CPT | Performed by: FAMILY MEDICINE

## 2018-12-22 LAB — HEMOCCULT STL QL IA: NEGATIVE

## 2019-01-10 ENCOUNTER — HOSPITAL ENCOUNTER (OUTPATIENT)
Dept: MAMMOGRAPHY | Facility: CLINIC | Age: 59
Discharge: HOME | End: 2019-01-10
Attending: FAMILY MEDICINE | Admitting: FAMILY MEDICINE
Payer: COMMERCIAL

## 2019-01-10 DIAGNOSIS — Z12.31 VISIT FOR SCREENING MAMMOGRAM: ICD-10-CM

## 2019-01-10 PROCEDURE — 77063 BREAST TOMOSYNTHESIS BI: CPT

## 2019-02-08 ENCOUNTER — OFFICE VISIT (OUTPATIENT)
Dept: FAMILY MEDICINE | Facility: CLINIC | Age: 59
End: 2019-02-08
Payer: COMMERCIAL

## 2019-02-08 VITALS
OXYGEN SATURATION: 100 % | HEIGHT: 64 IN | SYSTOLIC BLOOD PRESSURE: 117 MMHG | DIASTOLIC BLOOD PRESSURE: 75 MMHG | WEIGHT: 105 LBS | HEART RATE: 78 BPM | BODY MASS INDEX: 17.93 KG/M2

## 2019-02-08 DIAGNOSIS — J40 BRONCHITIS: Primary | ICD-10-CM

## 2019-02-08 RX ORDER — ALBUTEROL SULFATE 90 UG/1
2 AEROSOL, METERED RESPIRATORY (INHALATION) EVERY 4 HOURS PRN
Qty: 1 INHALER | Refills: 3 | Status: SHIPPED | OUTPATIENT
Start: 2019-02-08 | End: 2020-12-14

## 2019-02-08 RX ORDER — FLUTICASONE PROPIONATE 50 MCG
1 SPRAY, SUSPENSION (ML) NASAL DAILY
Start: 2019-02-08 | End: 2020-02-08

## 2019-02-08 RX ORDER — AZITHROMYCIN 250 MG/1
TABLET, FILM COATED ORAL
Qty: 6 TABLET | Refills: 0 | Status: SHIPPED | OUTPATIENT
Start: 2019-02-08 | End: 2020-12-14

## 2019-02-08 ASSESSMENT — MIFFLIN-ST. JEOR: SCORE: 1032.77

## 2019-02-08 NOTE — NURSING NOTE
"58 year old  Chief Complaint   Patient presents with     Cough     with yeloowish thick flym coming up. pt has been sick for 11 days. She reports the cough is deep in her chest     Ear Problem     both ears popping       Blood pressure 117/75, pulse 78, height 1.628 m (5' 4.09\"), weight 47.6 kg (105 lb), SpO2 100 %. Body mass index is 17.97 kg/m .  Patient Active Problem List   Diagnosis     Preventative health care     Migraine headache     Bilateral bunions     Family history of early CAD     Sensory hearing loss, bilateral       Wt Readings from Last 2 Encounters:   02/08/19 47.6 kg (105 lb)   11/08/18 49.4 kg (109 lb)     BP Readings from Last 3 Encounters:   02/08/19 117/75   11/08/18 125/79   10/05/17 109/75         Current Outpatient Medications   Medication     Aspirin-Acetaminophen-Caffeine (EXCEDRIN PO)     CALCIUM PO     MAGNESIUM PO     No current facility-administered medications for this visit.        Social History     Tobacco Use     Smoking status: Never Smoker     Smokeless tobacco: Never Used   Substance Use Topics     Alcohol use: No     Drug use: No       Health Maintenance Due   Topic Date Due     HIV SCREEN (SYSTEM ASSIGNED)  12/15/1976     ZOSTER IMMUNIZATION (1 of 2) 12/15/2008     ADVANCE DIRECTIVE PLANNING Q5 YRS  12/15/2013     PAP SCREENING Q3 YR (SYSTEM ASSIGNED)  01/01/2018       Lab Results   Component Value Date    PAP NIL 09/20/2010 February 8, 2019 3:54 PM  "

## 2019-02-08 NOTE — PROGRESS NOTES
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 Illness began as a sore throat, swollen glands, and headache. She is coughing deeply, and is unable to clear her throat. She feels that she is wheezing and the cough is deep. She has yellow, thick phlegm with coughing. Her ears are pop when she blows her nose. She is not sleeping well because of the cough. No chills or fever. No sinus pressure. She took decongestant once and took Excedrin once. No changes in appetite.     Patient Active Problem List   Diagnosis     Preventative health care     Migraine headache     Bilateral bunions     Family history of early CAD     Sensory hearing loss, bilateral       Current Outpatient Medications   Medication Sig Dispense Refill     Aspirin-Acetaminophen-Caffeine (EXCEDRIN PO) Take 1 tablet by mouth as needed.       CALCIUM PO        MAGNESIUM PO With Vitamin B.         Allergies   Allergen Reactions     Amoxicillin Hives     Possibly due to doseage        EXAM  /75   Pulse 78   Ht 1.628 m (5' 4.09\")   Wt 47.6 kg (105 lb)   SpO2 100%   BMI 17.97 kg/m    Gen: Appears fatigued, coughing  HEENT:  Conjunctiva nl, TM normal bilaterally, OP clear, no posterior erythema  Neck: no LAD  COR: S1,S2, no murmur  Lungs: CTA bilaterally, no rhonchi, wheezes or rales      Assessment:  (J40) Bronchitis  (primary encounter diagnosis)  Comment: persistent cough, purulent sputum  Plan: albuterol (PROAIR HFA/PROVENTIL HFA/VENTOLIN         HFA) 108 (90 Base) MCG/ACT inhaler,         azithromycin (ZITHROMAX) 250 MG tablet,         fluticasone (FLONASE) 50 MCG/ACT nasal spray        Recommend Z pack, use of proair inhaler to treat cough q 1-4 hr. Recommend flonase nasal spray to open sinus passages, help with bilateral ear fullness.     Carolann Caldwell MD  Internal Medicine/Pediatrics      I, Ruma Aragon, am serving as a scribe to " document services personally performed by Dr. Carolann Caldwell, based on data collection and the provider's statements to me. Dr. Caldwell has reviewed, edited, and approved the above note.

## 2019-04-11 NOTE — TELEPHONE ENCOUNTER
Evelia called back - states she did tell you about carol ann foot pain at visit last fall, with Dr Sagastume. States both feet are painful; but right > left. She said Dr Sagastume mentioned she should go to [facility] - she would like to have physical therapy at [facility].  Would you authorize PT?   yes

## 2019-11-05 ENCOUNTER — HEALTH MAINTENANCE LETTER (OUTPATIENT)
Age: 59
End: 2019-11-05

## 2019-12-10 ENCOUNTER — OFFICE VISIT (OUTPATIENT)
Dept: FAMILY MEDICINE | Facility: CLINIC | Age: 59
End: 2019-12-10
Payer: COMMERCIAL

## 2019-12-10 VITALS
OXYGEN SATURATION: 100 % | RESPIRATION RATE: 14 BRPM | HEIGHT: 64 IN | BODY MASS INDEX: 18.61 KG/M2 | TEMPERATURE: 97.7 F | DIASTOLIC BLOOD PRESSURE: 80 MMHG | SYSTOLIC BLOOD PRESSURE: 126 MMHG | HEART RATE: 69 BPM | WEIGHT: 109 LBS

## 2019-12-10 DIAGNOSIS — R13.14 PHARYNGOESOPHAGEAL DYSPHAGIA: ICD-10-CM

## 2019-12-10 DIAGNOSIS — Z00.00 PREVENTATIVE HEALTH CARE: Primary | ICD-10-CM

## 2019-12-10 DIAGNOSIS — G43.009 MIGRAINE WITHOUT AURA AND WITHOUT STATUS MIGRAINOSUS, NOT INTRACTABLE: ICD-10-CM

## 2019-12-10 DIAGNOSIS — Z13.1 SCREENING FOR DIABETES MELLITUS: ICD-10-CM

## 2019-12-10 DIAGNOSIS — Z12.11 SCREEN FOR COLON CANCER: ICD-10-CM

## 2019-12-10 DIAGNOSIS — Z13.21 ENCOUNTER FOR VITAMIN DEFICIENCY SCREENING: ICD-10-CM

## 2019-12-10 DIAGNOSIS — Z13.220 SCREENING CHOLESTEROL LEVEL: ICD-10-CM

## 2019-12-10 LAB
CHOLEST SERPL-MCNC: 192 MG/DL (ref 0–200)
CHOLEST/HDLC SERPL: 2.4 {RATIO} (ref 0–5)
FASTING SPECIMEN: YES
GLUCOSE CASUAL: 90 MG/DL (ref 51–200)
HDLC SERPL-MCNC: 79 MG/DL
LDLC SERPL CALC-MCNC: 100 MG/DL (ref 0–129)
TRIGL SERPL-MCNC: 64 MG/DL (ref 0–150)
VLDL-CHOLESTEROL: 13 (ref 7–32)

## 2019-12-10 RX ORDER — RIZATRIPTAN BENZOATE 5 MG/1
5 TABLET, ORALLY DISINTEGRATING ORAL
Qty: 9 TABLET | Refills: 1 | Status: SHIPPED | OUTPATIENT
Start: 2019-12-10

## 2019-12-10 ASSESSMENT — MIFFLIN-ST. JEOR: SCORE: 1045.29

## 2019-12-10 NOTE — NURSING NOTE
"58 year old  Chief Complaint   Patient presents with     Physical     Sleep Problem     due to mirgaines and inability to swollow pills        Blood pressure 134/81, pulse 68, temperature 97.7  F (36.5  C), temperature source Oral, resp. rate 14, height 1.619 m (5' 3.74\"), weight 49.4 kg (109 lb), SpO2 100 %. Body mass index is 18.86 kg/m .  Patient Active Problem List   Diagnosis     Preventative health care     Migraine headache     Bilateral bunions     Family history of early CAD     Sensory hearing loss, bilateral       Wt Readings from Last 2 Encounters:   12/10/19 49.4 kg (109 lb)   02/08/19 47.6 kg (105 lb)     BP Readings from Last 3 Encounters:   12/10/19 134/81   02/08/19 117/75   11/08/18 125/79         Current Outpatient Medications   Medication     Aspirin-Acetaminophen-Caffeine (EXCEDRIN PO)     CALCIUM PO     MAGNESIUM PO     albuterol (PROAIR HFA/PROVENTIL HFA/VENTOLIN HFA) 108 (90 Base) MCG/ACT inhaler     azithromycin (ZITHROMAX) 250 MG tablet     fluticasone (FLONASE) 50 MCG/ACT nasal spray     No current facility-administered medications for this visit.        Social History     Tobacco Use     Smoking status: Never Smoker     Smokeless tobacco: Never Used   Substance Use Topics     Alcohol use: No     Drug use: No       Health Maintenance Due   Topic Date Due     ADVANCE CARE PLANNING  1960     HIV SCREENING  12/15/1973     HPV  12/15/1979     ZOSTER IMMUNIZATION (1 of 2) 12/15/2008     PHQ-2  01/01/2019     PREVENTIVE CARE VISIT  11/08/2019     FIT  12/17/2019     PAP  01/01/2020       Lab Results   Component Value Date    PAP NIL 09/20/2010         December 10, 2019 1:01 PM    "

## 2019-12-10 NOTE — PROGRESS NOTES
"CHIEF COMPLAINT: Annual Physical      HISTORY OF PRESENT ILLNESS: Evelia Teixeira is a 58 year old female who presents with her  (Tal) for an annual physical. Overall she is doing well.     She has a history of migraines without aura and takes Excedrin which is effective. However, she has noticed when she swallows pills at night, they get stuck in her throat when she lays down. This has gradually worsened over time. This causes a burning sensation in her throat. This does not occur with food.     She has been having difficulty sleeping due to some neck discomfort. She would like to do physical therapy to help with her with this.       FAMILY, SOCIAL AND PAST SURGICAL HISTORIES:  Unchanged.       MEDICATIONS:  Carefully reviewed.       HEALTHCARE MAINTENANCE:    Health Maintenance   Topic Date Due     ADVANCE CARE PLANNING  1960     HIV SCREENING  12/15/1973     HPV  12/15/1979     ZOSTER IMMUNIZATION (1 of 2) 12/15/2008     PHQ-2  01/01/2019     PREVENTIVE CARE VISIT  11/08/2019     FIT  12/17/2019     PAP  01/01/2020     MAMMO SCREENING  01/10/2021     DEXA  11/08/2021     LIPID  11/08/2023     DTAP/TDAP/TD IMMUNIZATION (3 - Td) 08/18/2026     HEPATITIS C SCREENING  Completed     INFLUENZA VACCINE  Addressed     IPV IMMUNIZATION  Aged Out     MENINGITIS IMMUNIZATION  Aged Out     Eye exam: Up to date  Hearing: No concerns  Dental: Up to date  BP: 134/81  BMI: 18.86 kg/m .  Immunizations: Due for flu vaccine, declines. Tdap is up to date until 2026.   Mammogram: Up to date  Pap: Up to date  Colonoscopy: FIT card     REVIEW OF SYSTEMS:  A 10-point review is negative.       OBJECTIVE:    GENERAL: Evelia is in no distress.  Affect is upbeat.     VITAL SIGNS: /81 (BP Location: Right arm, Patient Position: Sitting, Cuff Size: Adult Regular)   Pulse 68   Temp 97.7  F (36.5  C) (Oral)   Resp 14   Ht 1.619 m (5' 3.74\")   Wt 49.4 kg (109 lb)   SpO2 100%   BMI 18.86 kg/m    HEENT:  Head is normocephalic, " atraumatic. Ears clear bilaterally. No pain with palpation of the frontal or maxillary sinuses.  Eyes are grossly normal.  Nose is free of any congestion or discharge.  Teeth in good repair.  Mucous membranes are moist.  Throat looks benign.  Neck is supple without adenopathy or thyromegaly.  No carotid bruits are heard, no JVD.   BACK:  Smooth and straight.  No pain to percussion.  No CVA tenderness.   LUNGS:  Clear to auscultation bilaterally.   HEART:  Regular rate and rhythm without murmur.   ABDOMEN:  Benign.     EXTREMITIES:  Ankles free of any edema.   SKIN:  Warm and dry.       LABORATORY DATA: Labs pending      ASSESSMENT AND PLAN:   1. Adult physical exam, up to date on healthcare maintenance strategies.   2. Routine screening. Glucose and lipid panel, pending  3. Screening for vitamin D deficiency. Vitamin D level, pending  4. Colon cancer screening with a FIT card  5. Migraine without aura and without status migrainosus, not intractable. Prescribed Maxalt-MLT 5 mg.   6. Persistent and paroxysmal pharyngoesophageal dysphagia. EGD ordered  7. Neck discomfort. Referral to physical therapy (Orthology at her request) given  8. Follow-up in 1 year for annual physical    Call with any problems or questions.     I, Chava Kumar, am serving as a scribe to document services personally performed by Dr. Timothy Sagastume, based on data collection and the provider's statements to me. Dr. Sagastume has reviewed, edited, and approved the above note.     --Timothy Sagastume MD

## 2019-12-11 LAB — DEPRECATED CALCIDIOL+CALCIFEROL SERPL-MC: 37 UG/L (ref 20–75)

## 2019-12-21 DIAGNOSIS — Z12.11 SCREEN FOR COLON CANCER: ICD-10-CM

## 2020-01-15 DIAGNOSIS — M54.2 NECK AND SHOULDER PAIN: ICD-10-CM

## 2020-01-15 DIAGNOSIS — M54.2 NECK PAIN: Primary | ICD-10-CM

## 2020-01-15 DIAGNOSIS — M25.519 NECK AND SHOULDER PAIN: ICD-10-CM

## 2020-01-15 NOTE — PROGRESS NOTES
Referral faxed to Orthology at [phone]      Kianna Perez RN  January 15, 2020 4:44 PM

## 2020-02-16 ENCOUNTER — HEALTH MAINTENANCE LETTER (OUTPATIENT)
Age: 60
End: 2020-02-16

## 2020-09-21 LAB
HPV ABSTRACT: NORMAL
PAP-ABSTRACT: NORMAL

## 2020-11-19 ENCOUNTER — NURSE TRIAGE (OUTPATIENT)
Dept: FAMILY MEDICINE | Facility: CLINIC | Age: 60
End: 2020-11-19

## 2020-11-19 NOTE — TELEPHONE ENCOUNTER
Patient c/o middle fingers on both hands with numbness. About a month ago - started while on a hike outside, lasted about 1 hour and then resolved. Then didn't have this again this week. This week she has had this a few days, and it comes and goes. The numbness is felt on the tips of fingers only. She denies chest pain, breathing problems, headache, dizziness, vision changes, back/neck pain. She is otherwise healthy. She can open/close hands normally and uses hands per usual.     Additional Information    [1] Numbness or tingling on both sides of body AND [2] is a new symptom present > 24 hours    Protocols used: NEUROLOGIC DEFICIT-A-    Advised she schedule appointment with PCP for evaluation and she agrees with this plan.     Dodie Monroy RN  11/19/20  1:49 PM

## 2020-11-19 NOTE — TELEPHONE ENCOUNTER
[facility] Call Center    Phone Message    May a detailed message be left on voicemail: yes     Reason for Call: Symptoms or Concerns     If patient has red-flag symptoms, warm transfer to triage line    Current symptom or concern: Numbness in both middle fingers - Hand weakness    Symptoms have been present for:  1 month(s)    Has patient previously been seen for this? No    By : N/A    Date: N/A    Are there any new or worsening symptoms? Yes: It started in October - It's hard for patient to open jars and have been dropping glass more often. Patient would like advise on what she should do. Thank you.     Action Taken: Message routed to:  [facility] ER: [facility]    Travel Screening: Not Applicable

## 2020-11-20 ENCOUNTER — OFFICE VISIT (OUTPATIENT)
Dept: FAMILY MEDICINE | Facility: CLINIC | Age: 60
End: 2020-11-20
Payer: COMMERCIAL

## 2020-11-20 VITALS
SYSTOLIC BLOOD PRESSURE: 121 MMHG | TEMPERATURE: 96.8 F | BODY MASS INDEX: 18.52 KG/M2 | WEIGHT: 108.5 LBS | HEART RATE: 85 BPM | RESPIRATION RATE: 15 BRPM | DIASTOLIC BLOOD PRESSURE: 81 MMHG | HEIGHT: 64 IN | OXYGEN SATURATION: 100 %

## 2020-11-20 DIAGNOSIS — I73.00 RAYNAUD'S DISEASE WITHOUT GANGRENE: Primary | ICD-10-CM

## 2020-11-20 DIAGNOSIS — G56.03 BILATERAL CARPAL TUNNEL SYNDROME: ICD-10-CM

## 2020-11-20 ASSESSMENT — MIFFLIN-ST. JEOR: SCORE: 1043.64

## 2020-11-20 NOTE — NURSING NOTE
"59 year old  Chief Complaint   Patient presents with     Numbness     complete numbness of tips of middle fingers, intermittent, alternates between left and right        Blood pressure 121/81, pulse 85, temperature 96.8  F (36  C), temperature source Skin, resp. rate 15, height 1.628 m (5' 4.09\"), weight 49.2 kg (108 lb 8 oz), SpO2 100 %. Body mass index is 18.57 kg/m .  Patient Active Problem List   Diagnosis     Preventative health care     Migraine headache     Bilateral bunions     Family history of early CAD     Sensory hearing loss, bilateral       Wt Readings from Last 2 Encounters:   11/20/20 49.2 kg (108 lb 8 oz)   12/10/19 49.4 kg (109 lb)     BP Readings from Last 3 Encounters:   11/20/20 121/81   12/10/19 126/80   02/08/19 117/75         Current Outpatient Medications   Medication     Aspirin-Acetaminophen-Caffeine (EXCEDRIN PO)     CALCIUM PO     MAGNESIUM PO     rizatriptan (MAXALT-MLT) 5 MG ODT     albuterol (PROAIR HFA/PROVENTIL HFA/VENTOLIN HFA) 108 (90 Base) MCG/ACT inhaler     azithromycin (ZITHROMAX) 250 MG tablet     No current facility-administered medications for this visit.        Social History     Tobacco Use     Smoking status: Never Smoker     Smokeless tobacco: Never Used   Substance Use Topics     Alcohol use: No     Drug use: No       Health Maintenance Due   Topic Date Due     ADVANCE CARE PLANNING  1960     HIV SCREENING  12/15/1973     ZOSTER IMMUNIZATION (1 of 2) 12/15/2008     PAP  01/01/2018     COLORECTAL CANCER SCREENING  12/17/2019     INFLUENZA VACCINE (1) 09/01/2020     PREVENTIVE CARE VISIT  12/10/2020       Lab Results   Component Value Date    PAP NIL 09/20/2010 November 20, 2020 10:52 AM    " Declines

## 2020-11-20 NOTE — PROGRESS NOTES
"  SUBJECTIVE:   Cristine a 59 year old female who presents to clinic today to discuss the following problem(s).       #Bilateral hand Numbness    Location: distal phalanges of middle fingers, bilaterally  Duration:1 month, intermittent   Trauma/ Fall? No, onset during hike in 45 degree fahrenheit weather  Swelling? No  Numbness/ Tingling? Numbness  Weakness? Some. Struggling with  strength the past couple of years.  Imaging? No  Treatment? None      History:  -started in October while hiking in the cold, one middle finger tip got numb. Temperature around 45.  -finger became a little more white, longer cap refill.  -happened again 3 times last week, goes from one hand to the other  -only feels numbness around distal part of middle fingers  -hot flashes make numbness go away, can last up to hours  -episodes associated with whitening of finger tip  -\"drop things more often, but not recent. Last couple years\"  -doesn't type frequently     -different numbness, tingling in last 2 fingers, both sides  -years  -wakes up with hands numb at times, concerning for carpal tunnel      ROS: Denies fevers, chills, chest pain, difficulty breathing, abdominal pain    Past Medical History:   Diagnosis Date     Absence of menstruation     Age 40     Disorder of bone and cartilage, unspecified      Hemorrhoid      Sinus infection     Recurrent     Past Surgical History:   Procedure Laterality Date     TUBAL LIGATION       Family History   Problem Relation Age of Onset     C.A.D. Mother      Hypertension Mother      Cerebrovascular Disease Mother      C.A.D. Father      Hypertension Father      Cancer Father         bladder cancer     Diabetes Paternal Grandfather      Social History     Tobacco Use     Smoking status: Never Smoker     Smokeless tobacco: Never Used   Substance Use Topics     Alcohol use: No     Drug use: No     Social History     Social History Narrative     Not on file       Current Outpatient Medications   Medication " "    Aspirin-Acetaminophen-Caffeine (EXCEDRIN PO)     CALCIUM PO     MAGNESIUM PO     rizatriptan (MAXALT-MLT) 5 MG ODT     albuterol (PROAIR HFA/PROVENTIL HFA/VENTOLIN HFA) 108 (90 Base) MCG/ACT inhaler     azithromycin (ZITHROMAX) 250 MG tablet     No current facility-administered medications for this visit.      I have reviewed the patient's past medical, surgical, family, and social history.     OBJECTIVE:   /81 (BP Location: Right arm, Patient Position: Sitting, Cuff Size: Adult Regular)   Pulse 85   Temp 96.8  F (36  C) (Skin)   Resp 15   Ht 1.628 m (5' 4.09\")   Wt 49.2 kg (108 lb 8 oz)   SpO2 100%   BMI 18.57 kg/m         WRIST/HAND EXAM:   Inspection: Swelling - no; Atrophy - no  Sensation: intact in median, radial, ulnar distribution  ROM:    Wrist: flexion- full/ extension- full/ pronation- full/ supination- full;    radial deviation - full; ulnar deviation- full   Hand: Full flexion at PIP/DIP, finger abduction/ adduction.   Strength: 5/5 in all motions  Bony tenderness:    Wrist: Carpal bones: none; Snuffbox: none   Hand: Metacarpals: none; Phalanges: none  Tendons: FDS/FDP/Extensor mechanism intact  Maneuvers:    -Tinel's/Phalen; negative bilaterally  Other: No nodules palpated in palmar aspect.      Constitutional: well-appearing, appears stated age  Eyes: conjunctivae without erythema, sclera anicteric.   Cardiac: regular rate and rhythm, normal S1/S2, no murmur/rubs/gallops  Respiratory: lungs clear to auscultation bilaterally, normal work of breathing, no wheezes/crackles  Skin: no rashes, lesions, or wounds  Psych: affect is full and appropriate, speech is fluent and non-pressured    ASSESSMENT AND PLAN:         Evelia is a 59 year old female with an unspecified disorder of bone and cartilage who present to the clinic today for a chief complaint of bilateral numbness of the distal phalanges of her middle fingers. The numbness started 1 month ago during a hike in cooler weather, and " "recently recurred several times in the past week. Episodes last a few hours, and are associated with whitening of the involved finger tips.  Negative Phalen and Tinnel test on physical exam. Symptoms seem to fit with diagnosis of Raynaud's phenomenon. Patient also reported some hand numbness after waking up in the morning, concerning for carpal tunnel syndrome.     Follow up with patient for visit this December. If numbness and intermittent discoloration continue, start on 2.5 mg amlodipine for Raynaud's. Trial wrist splint on one hand at night, and compare numbness sensation in both fingers. If improved, consider diagnosis of carpal tunnel. Will consider all of this data in December for final diagnosis.      Tian Burk   [facility]  11/20/2020, 1:27 PM     \"I was present with the medical student who participated in the service and in the documentation of the note. I have verified the history and personally performed the physical exam and medical decision making. I agree with the assessment and plan of care as documented in the note.\"       Timothy Sagastume MD  11/20/2020       "

## 2020-11-22 ENCOUNTER — HEALTH MAINTENANCE LETTER (OUTPATIENT)
Age: 60
End: 2020-11-22

## 2020-12-14 ENCOUNTER — OFFICE VISIT (OUTPATIENT)
Dept: FAMILY MEDICINE | Facility: CLINIC | Age: 60
End: 2020-12-14
Payer: COMMERCIAL

## 2020-12-14 VITALS
BODY MASS INDEX: 18.65 KG/M2 | OXYGEN SATURATION: 100 % | DIASTOLIC BLOOD PRESSURE: 78 MMHG | TEMPERATURE: 96.8 F | RESPIRATION RATE: 15 BRPM | HEIGHT: 64 IN | SYSTOLIC BLOOD PRESSURE: 118 MMHG | HEART RATE: 69 BPM | WEIGHT: 109.25 LBS

## 2020-12-14 DIAGNOSIS — Z12.11 SCREENING FOR COLON CANCER: ICD-10-CM

## 2020-12-14 DIAGNOSIS — Z12.31 VISIT FOR SCREENING MAMMOGRAM: ICD-10-CM

## 2020-12-14 DIAGNOSIS — Z00.00 WELLNESS EXAMINATION: Primary | ICD-10-CM

## 2020-12-14 DIAGNOSIS — Z81.8 FAMILY HISTORY OF FIRST DEGREE RELATIVE WITH DEMENTIA: ICD-10-CM

## 2020-12-14 DIAGNOSIS — Z13.220 SCREENING FOR LIPID DISORDERS: ICD-10-CM

## 2020-12-14 PROBLEM — I73.00 RAYNAUD'S DISEASE WITHOUT GANGRENE: Status: RESOLVED | Noted: 2020-11-20 | Resolved: 2020-12-14

## 2020-12-14 LAB
BUN SERPL-MCNC: 14 MG/DL (ref 7–30)
CALCIUM SERPL-MCNC: 9.4 MG/DL (ref 8.5–10.4)
CHLORIDE SERPLBLD-SCNC: 109 MMOL/L (ref 94–109)
CHOLEST SERPL-MCNC: 187 MG/DL (ref 0–200)
CHOLEST/HDLC SERPL: 2.6 {RATIO} (ref 0–5)
CO2 SERPL-SCNC: 31 MMOL/L (ref 20–32)
CREAT SERPL-MCNC: 0.6 MG/DL (ref 0.6–1.3)
CRP SERPL-MCNC: <2.9 MG/L (ref 0–8)
EGFR CALCULATED (BLACK REFERENCE): 130.7
EGFR CALCULATED (NON BLACK REFERENCE): 108
FASTING SPECIMEN: YES
GLUCOSE SERPL-MCNC: 91 MG/DL (ref 60–99)
HDLC SERPL-MCNC: 71 MG/DL
LDLC SERPL CALC-MCNC: 102 MG/DL (ref 0–129)
POTASSIUM SERPL-SCNC: 3.7 MMOL/L (ref 3.4–5.3)
SODIUM SERPL-SCNC: 146 MMOL/L (ref 137.3–146.3)
T3 SERPL-MCNC: 106 NG/DL (ref 60–181)
T4 FREE SERPL-MCNC: 0.94 NG/DL (ref 0.76–1.46)
TRIGL SERPL-MCNC: 69 MG/DL (ref 0–150)
TSH SERPL DL<=0.005 MIU/L-ACNC: 1.81 MU/L (ref 0.4–4)
VLDL-CHOLESTEROL: 14 (ref 7–32)

## 2020-12-14 RX ORDER — ERGOCALCIFEROL 1.25 MG/1
50000 CAPSULE, LIQUID FILLED ORAL WEEKLY
COMMUNITY

## 2020-12-14 ASSESSMENT — MIFFLIN-ST. JEOR: SCORE: 1045.55

## 2020-12-14 NOTE — NURSING NOTE
"59 year old  Chief Complaint   Patient presents with     Physical     no other concerns        Blood pressure 118/78, pulse 69, temperature 96.8  F (36  C), temperature source Skin, resp. rate 15, height 1.626 m (5' 4\"), weight 49.6 kg (109 lb 4 oz), SpO2 100 %. Body mass index is 18.75 kg/m .  Patient Active Problem List   Diagnosis     Preventative health care     Migraine headache     Bilateral bunions     Family history of early CAD     Sensory hearing loss, bilateral     Raynaud's disease without gangrene     Bilateral carpal tunnel syndrome       Wt Readings from Last 2 Encounters:   12/14/20 49.6 kg (109 lb 4 oz)   11/20/20 49.2 kg (108 lb 8 oz)     BP Readings from Last 3 Encounters:   12/14/20 118/78   11/20/20 121/81   12/10/19 126/80         Current Outpatient Medications   Medication     Aspirin-Acetaminophen-Caffeine (EXCEDRIN PO)     CALCIUM PO     MAGNESIUM PO     rizatriptan (MAXALT-MLT) 5 MG ODT     vitamin D2 (ERGOCALCIFEROL) 08537 units (1250 mcg) capsule     albuterol (PROAIR HFA/PROVENTIL HFA/VENTOLIN HFA) 108 (90 Base) MCG/ACT inhaler     azithromycin (ZITHROMAX) 250 MG tablet     No current facility-administered medications for this visit.        Social History     Tobacco Use     Smoking status: Never Smoker     Smokeless tobacco: Never Used   Substance Use Topics     Alcohol use: No     Drug use: No       Health Maintenance Due   Topic Date Due     ADVANCE CARE PLANNING  1960     HIV SCREENING  12/15/1973     ZOSTER IMMUNIZATION (1 of 2) 12/15/2008     PAP  01/01/2018     COLORECTAL CANCER SCREENING  12/17/2019     INFLUENZA VACCINE (1) 09/01/2020     PREVENTIVE CARE VISIT  12/10/2020     MAMMO SCREENING  01/10/2021       Lab Results   Component Value Date    PAP NIL 09/20/2010 December 14, 2020 8:09 AM    "

## 2020-12-14 NOTE — PROGRESS NOTES
"CHIEF COMPLAINT:  Annual wellness visit.      HISTORY OF PRESENT ILLNESS:  Evelia is a 59 year old who saw me recently for what we felt was classic Raynaud syndrome symptoms.  Interestingly, ever since that visit, she has not been bothered by those symptoms at all, this despite the fact that the weather has gotten quite a bit colder.  Interestingly, before she saw me, she had what felt like a UTI.  She was doing some research and found that some people get Raynaud-like symptoms after a viral infection.  In any case, she is fine now and does not need to take a medication like amlodipine.        She has a history of migraine headaches and has used her Maxalt MLT just once in the last year.  We reviewed her medications.  Her med list has been simplified and pared back.      FAMILY HISTORY:  Her sister has something called primary progressive aphasia, which is leading to dementia.  This has been very distressing to Evelia.  In addition, her mother has significant dementia.  She recently read a book called \"Ending Alzheimer,\" and in it, the author advocates a number of tests be done.  Evelia would like to do them if possible.  The particular LDL test she wanted is not available, but we will go ahead and do apolipoprotein A1 and B.  In addition, we will do vitamin D, TSH, free T3, T3 total, T3 reverse, homocysteine, CRP inflammatory marker and DHEA sulfate.  She will be notified of all those results.  All of these will be linked to the fact that she has a first-degree relative with dementia.      HEALTHCARE MAINTENANCE:  Her eyes have not been checked recently, but she went about a little over a year ago.  She will go again in 2021.  Her hearing is fine and reveals no changes.  Dental care is up to date.  She has a cleaning coming up.  BP is perfect at 108/78, and this is \"high\" for her, as systolic is often in the 101 range.  Body mass index is 18.75.  In addition to the labs described above, we will go ahead with a lipid panel " and a basic metabolic panel.  She would like to do Cologuard for colon cancer screening purposes.  Mammogram is pending for 12/31/2020.  Finally, she had a Pap smear on 09/21.  She declines immunizations and specifically declined a flu shot at work.  (She is a nurse at [employer].)      OBJECTIVE:     GENERAL:  Evelia is in no distress.  Breathing comfortably.  Affect is upbeat.     VITAL SIGNS:  BP is 118/78 with a pulse of 69 and regular.  Temperature is 96.8.  She is 5 feet 4 and weighs 109 pounds 4 ounces with a BMI of 18.75.  O2 sats are 100% on room air.   HEENT:  Head is normocephalic, atraumatic.  Ears show some cerumen on the right and some on the left, but more on the right.  She can hear just fine.  No pain with palpation of the frontal or maxillary sinuses.  Eyes are grossly normal.  She is wearing a mask.   NECK:  No anterior or posterior chain adenopathy.  No visible or palpable thyromegaly.  No carotid bruits.   BACK:  Smooth and straight.   LUNGS:  Clear to auscultation bilaterally.   HEART:  Regular rate and rhythm without murmur.   EXTREMITIES:  Ankles are free of any edema.  Good peripheral pulses.      Multiple labs pending as indicated above.      ASSESSMENT/PLAN:   1.  Annual wellness visit, up to date with healthcare maintenance strategies.   2.  Colon cancer screening with a Cologuard.   3.  Mammogram pending for 12/31.   4.  Pap smear already obtained.   5.  Declines immunizations.   6.  Family history of both a sister with primary progressive aphasia and her mother with significant dementia.  Based on some research and reading that she has been doing, she wanted the above tests, which have been ordered.  She will be notified of all the results.   7.  Call with any problems or questions.

## 2020-12-15 LAB — DHEA-S SERPL-MCNC: 50 UG/DL (ref 35–430)

## 2020-12-16 LAB
APO A-I SERPL-MCNC: 174 MG/DL (ref 101–199)
APO B100 SERPL-MCNC: 76 MG/DL (ref 55–125)
DEPRECATED CALCIDIOL+CALCIFEROL SERPL-MC: 29 UG/L (ref 20–75)
HCYS SERPL-SCNC: 3 UMOL/L (ref 4–12)

## 2020-12-18 LAB — T3REVERSE SERPL-MCNC: 14 NG/DL (ref 9–27)

## 2020-12-27 LAB — COLOGUARD-ABSTRACT: NEGATIVE

## 2020-12-31 ENCOUNTER — HOSPITAL ENCOUNTER (OUTPATIENT)
Dept: MAMMOGRAPHY | Facility: CLINIC | Age: 60
Discharge: HOME | End: 2020-12-31
Attending: FAMILY MEDICINE | Admitting: FAMILY MEDICINE
Payer: COMMERCIAL

## 2020-12-31 DIAGNOSIS — Z12.31 VISIT FOR SCREENING MAMMOGRAM: ICD-10-CM

## 2020-12-31 PROCEDURE — 77063 BREAST TOMOSYNTHESIS BI: CPT

## 2021-01-06 ENCOUNTER — MYC MEDICAL ADVICE (OUTPATIENT)
Dept: FAMILY MEDICINE | Facility: CLINIC | Age: 61
End: 2021-01-06

## 2021-09-19 ENCOUNTER — HEALTH MAINTENANCE LETTER (OUTPATIENT)
Age: 61
End: 2021-09-19

## 2022-03-06 ENCOUNTER — HEALTH MAINTENANCE LETTER (OUTPATIENT)
Age: 62
End: 2022-03-06

## 2022-11-20 ENCOUNTER — HEALTH MAINTENANCE LETTER (OUTPATIENT)
Age: 62
End: 2022-11-20

## 2023-04-16 ENCOUNTER — HEALTH MAINTENANCE LETTER (OUTPATIENT)
Age: 63
End: 2023-04-16

## 2024-02-03 ENCOUNTER — HEALTH MAINTENANCE LETTER (OUTPATIENT)
Age: 64
End: 2024-02-03